# Patient Record
Sex: FEMALE | URBAN - METROPOLITAN AREA
[De-identification: names, ages, dates, MRNs, and addresses within clinical notes are randomized per-mention and may not be internally consistent; named-entity substitution may affect disease eponyms.]

---
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
## 2022-08-01 ENCOUNTER — HOSPITAL ENCOUNTER (OUTPATIENT)
Dept: HOSPITAL 92 - CSHLD/OP | Age: 22
Setting detail: OBSERVATION
LOS: 2 days | Discharge: HOME | End: 2022-08-03
Attending: OBSTETRICS & GYNECOLOGY | Admitting: OBSTETRICS & GYNECOLOGY
Payer: COMMERCIAL

## 2022-08-01 VITALS — BODY MASS INDEX: 23.6 KG/M2

## 2022-08-01 DIAGNOSIS — F41.9: ICD-10-CM

## 2022-08-01 DIAGNOSIS — Z20.822: ICD-10-CM

## 2022-08-01 DIAGNOSIS — O99.343: ICD-10-CM

## 2022-08-01 DIAGNOSIS — Z79.899: ICD-10-CM

## 2022-08-01 DIAGNOSIS — O60.03: Primary | ICD-10-CM

## 2022-08-01 DIAGNOSIS — Z3A.34: ICD-10-CM

## 2022-08-01 LAB
ALBUMIN SERPL BCG-MCNC: 3.7 G/DL (ref 3.5–5)
ALP SERPL-CCNC: 97 U/L (ref 40–110)
ALT SERPL W P-5'-P-CCNC: 7 U/L (ref 8–55)
ANION GAP SERPL CALC-SCNC: 14 MMOL/L (ref 10–20)
AST SERPL-CCNC: 11 U/L (ref 5–34)
BASOPHILS # BLD AUTO: 0 10X3/UL (ref 0–0.2)
BASOPHILS NFR BLD AUTO: 0.1 % (ref 0–2)
BILIRUB SERPL-MCNC: 0.4 MG/DL (ref 0.2–1.2)
BUN SERPL-MCNC: 8 MG/DL (ref 7–18.7)
CALCIUM SERPL-MCNC: 9.3 MG/DL (ref 7.8–10.44)
CHLORIDE SERPL-SCNC: 105 MMOL/L (ref 98–107)
CO2 SERPL-SCNC: 19 MMOL/L (ref 22–29)
CREAT CL PREDICTED SERPL C-G-VRATE: 163 ML/MIN (ref 70–130)
EOSINOPHIL # BLD AUTO: 0 10X3/UL (ref 0–0.5)
EOSINOPHIL NFR BLD AUTO: 0.4 % (ref 0–6)
GLOBULIN SER CALC-MCNC: 2.8 G/DL (ref 2.4–3.5)
GLUCOSE SERPL-MCNC: 87 MG/DL (ref 70–105)
HBSAG INDEX: 0.17 S/CO (ref 0–0.99)
HGB BLD-MCNC: 9.7 G/DL (ref 12–15.5)
LYMPHOCYTES NFR BLD AUTO: 23.2 % (ref 18–47)
MCH RBC QN AUTO: 29.6 PG (ref 27–33)
MCV RBC AUTO: 86.6 FL (ref 81.6–98.3)
MONOCYTES # BLD AUTO: 0.6 10X3/UL (ref 0–1.1)
MONOCYTES NFR BLD AUTO: 7.6 % (ref 0–10)
NEUTROPHILS # BLD AUTO: 5 10X3/UL (ref 1.5–8.4)
NEUTROPHILS NFR BLD AUTO: 68.4 % (ref 40–75)
PLATELET # BLD AUTO: 347 10X3/UL (ref 150–450)
POTASSIUM SERPL-SCNC: 3.8 MMOL/L (ref 3.5–5.1)
RBC # BLD AUTO: 3.28 10X6/UL (ref 3.9–5.03)
SODIUM SERPL-SCNC: 134 MMOL/L (ref 136–145)
SP GR UR STRIP: 1.02 (ref 1–1.04)
SYPHILIS ANTIBODY INDEX: 0.06 S/CO
WBC # BLD AUTO: 7.3 10X3/UL (ref 3.5–10.5)

## 2022-08-01 PROCEDURE — 85025 COMPLETE CBC W/AUTO DIFF WBC: CPT

## 2022-08-01 PROCEDURE — 80053 COMPREHEN METABOLIC PANEL: CPT

## 2022-08-01 PROCEDURE — 86780 TREPONEMA PALLIDUM: CPT

## 2022-08-01 PROCEDURE — 99285 EMERGENCY DEPT VISIT HI MDM: CPT

## 2022-08-01 PROCEDURE — 96372 THER/PROPH/DIAG INJ SC/IM: CPT

## 2022-08-01 PROCEDURE — G0378 HOSPITAL OBSERVATION PER HR: HCPCS

## 2022-08-01 PROCEDURE — 76815 OB US LIMITED FETUS(S): CPT

## 2022-08-01 PROCEDURE — 87660 TRICHOMONAS VAGIN DIR PROBE: CPT

## 2022-08-01 PROCEDURE — 86900 BLOOD TYPING SEROLOGIC ABO: CPT

## 2022-08-01 PROCEDURE — 87510 GARDNER VAG DNA DIR PROBE: CPT

## 2022-08-01 PROCEDURE — 87081 CULTURE SCREEN ONLY: CPT

## 2022-08-01 PROCEDURE — U0002 COVID-19 LAB TEST NON-CDC: HCPCS

## 2022-08-01 PROCEDURE — 87340 HEPATITIS B SURFACE AG IA: CPT

## 2022-08-01 PROCEDURE — 81003 URINALYSIS AUTO W/O SCOPE: CPT

## 2022-08-01 PROCEDURE — 36415 COLL VENOUS BLD VENIPUNCTURE: CPT

## 2022-08-01 PROCEDURE — 86850 RBC ANTIBODY SCREEN: CPT

## 2022-08-01 PROCEDURE — 86901 BLOOD TYPING SEROLOGIC RH(D): CPT

## 2022-08-01 PROCEDURE — 87480 CANDIDA DNA DIR PROBE: CPT

## 2022-08-29 ENCOUNTER — HOSPITAL ENCOUNTER (INPATIENT)
Dept: HOSPITAL 92 - CSHLD/OP | Age: 22
LOS: 2 days | Discharge: HOME | End: 2022-08-31
Attending: OBSTETRICS & GYNECOLOGY | Admitting: OBSTETRICS & GYNECOLOGY
Payer: COMMERCIAL

## 2022-08-29 VITALS — BODY MASS INDEX: 23.9 KG/M2

## 2022-08-29 DIAGNOSIS — F41.9: ICD-10-CM

## 2022-08-29 DIAGNOSIS — U07.1: ICD-10-CM

## 2022-08-29 DIAGNOSIS — Z3A.38: ICD-10-CM

## 2022-08-29 DIAGNOSIS — F32.A: ICD-10-CM

## 2022-08-29 DIAGNOSIS — Z79.899: ICD-10-CM

## 2022-08-29 LAB
HBSAG INDEX: 0.2 S/CO (ref 0–0.99)
HGB BLD-MCNC: 10.2 G/DL (ref 12–15.5)
MCH RBC QN AUTO: 27.3 PG (ref 27–33)
MCV RBC AUTO: 82.8 FL (ref 81.6–98.3)
PLATELET # BLD AUTO: 372 10X3/UL (ref 150–450)
RBC # BLD AUTO: 3.73 10X6/UL (ref 3.9–5.03)
SARS-COV-2 RNA RESP QL NAA+PROBE: DETECTED
SYPHILIS ANTIBODY INDEX: 0.04 S/CO
WBC # BLD AUTO: 9.5 10X3/UL (ref 3.5–10.5)

## 2022-08-29 PROCEDURE — 85027 COMPLETE CBC AUTOMATED: CPT

## 2022-08-29 PROCEDURE — S0028 INJECTION, FAMOTIDINE, 20 MG: HCPCS

## 2022-08-29 PROCEDURE — 90744 HEPB VACC 3 DOSE PED/ADOL IM: CPT

## 2022-08-29 PROCEDURE — 86900 BLOOD TYPING SEROLOGIC ABO: CPT

## 2022-08-29 PROCEDURE — 99285 EMERGENCY DEPT VISIT HI MDM: CPT

## 2022-08-29 PROCEDURE — 36415 COLL VENOUS BLD VENIPUNCTURE: CPT

## 2022-08-29 PROCEDURE — 86780 TREPONEMA PALLIDUM: CPT

## 2022-08-29 PROCEDURE — 86901 BLOOD TYPING SEROLOGIC RH(D): CPT

## 2022-08-29 PROCEDURE — 86850 RBC ANTIBODY SCREEN: CPT

## 2022-08-29 PROCEDURE — U0002 COVID-19 LAB TEST NON-CDC: HCPCS

## 2022-08-29 PROCEDURE — 87340 HEPATITIS B SURFACE AG IA: CPT

## 2022-08-29 PROCEDURE — 51702 INSERT TEMP BLADDER CATH: CPT

## 2022-08-30 LAB
HGB BLD-MCNC: 9.1 G/DL (ref 12–15.5)
MCH RBC QN AUTO: 27.5 PG (ref 27–33)
MCV RBC AUTO: 83.7 FL (ref 81.6–98.3)
PLATELET # BLD AUTO: 315 10X3/UL (ref 150–450)
RBC # BLD AUTO: 3.31 10X6/UL (ref 3.9–5.03)
WBC # BLD AUTO: 19.3 10X3/UL (ref 3.5–10.5)

## 2022-08-30 RX ADMIN — HYDROCODONE BITARTRATE AND ACETAMINOPHEN PRN TAB: 5; 325 TABLET ORAL at 11:56

## 2022-08-30 RX ADMIN — HYDROCODONE BITARTRATE AND ACETAMINOPHEN PRN TAB: 5; 325 TABLET ORAL at 19:52

## 2022-08-31 VITALS — SYSTOLIC BLOOD PRESSURE: 116 MMHG | TEMPERATURE: 98.1 F | DIASTOLIC BLOOD PRESSURE: 74 MMHG

## 2022-08-31 RX ADMIN — HYDROCODONE BITARTRATE AND ACETAMINOPHEN PRN TAB: 5; 325 TABLET ORAL at 11:16
